# Patient Record
Sex: FEMALE | Race: WHITE | NOT HISPANIC OR LATINO | URBAN - METROPOLITAN AREA
[De-identification: names, ages, dates, MRNs, and addresses within clinical notes are randomized per-mention and may not be internally consistent; named-entity substitution may affect disease eponyms.]

---

## 2022-02-25 ENCOUNTER — NEW PATIENT (OUTPATIENT)
Dept: URBAN - METROPOLITAN AREA CLINIC 26 | Facility: CLINIC | Age: 57
End: 2022-02-25

## 2022-02-25 VITALS
SYSTOLIC BLOOD PRESSURE: 108 MMHG | WEIGHT: 140 LBS | HEIGHT: 67 IN | DIASTOLIC BLOOD PRESSURE: 74 MMHG | HEART RATE: 56 BPM | BODY MASS INDEX: 21.97 KG/M2

## 2022-02-25 DIAGNOSIS — H35.81: ICD-10-CM

## 2022-02-25 DIAGNOSIS — H43.393: ICD-10-CM

## 2022-02-25 PROCEDURE — 99204 OFFICE O/P NEW MOD 45 MIN: CPT

## 2022-02-25 PROCEDURE — 92235 FLUORESCEIN ANGRPH MLTIFRAME: CPT

## 2022-02-25 PROCEDURE — 92250 FUNDUS PHOTOGRAPHY W/I&R: CPT

## 2022-02-25 ASSESSMENT — TONOMETRY
OD_IOP_MMHG: 11
OS_IOP_MMHG: 11

## 2022-02-25 ASSESSMENT — VISUAL ACUITY
OD_SC: 20/80
OS_CC: 20/20
OS_SC: 20/40
OD_CC: 20/20

## 2022-02-25 NOTE — PATIENT DISCUSSION
Discussed workup for cotton wool spots including evaluation for diabetes, hypertension and other vasculopathic conditions.

## 2022-05-27 ENCOUNTER — FOLLOW UP (OUTPATIENT)
Dept: URBAN - METROPOLITAN AREA CLINIC 26 | Facility: CLINIC | Age: 57
End: 2022-05-27

## 2022-05-27 DIAGNOSIS — H25.13: ICD-10-CM

## 2022-05-27 DIAGNOSIS — H02.836: ICD-10-CM

## 2022-05-27 DIAGNOSIS — H35.81: ICD-10-CM

## 2022-05-27 DIAGNOSIS — H02.833: ICD-10-CM

## 2022-05-27 DIAGNOSIS — H43.393: ICD-10-CM

## 2022-05-27 PROCEDURE — 92250 FUNDUS PHOTOGRAPHY W/I&R: CPT

## 2022-05-27 PROCEDURE — 92014 COMPRE OPH EXAM EST PT 1/>: CPT

## 2022-05-27 ASSESSMENT — TONOMETRY
OD_IOP_MMHG: 13
OS_IOP_MMHG: 13

## 2022-05-27 ASSESSMENT — VISUAL ACUITY
OD_CC: 20/25-1
OS_CC: 20/25-1

## 2022-05-27 NOTE — PATIENT DISCUSSION
NEW 5/27/22 NO HISTORY OF HTN, HAS SOME GI SYMPTOMS TO SEE GI, OTHER DIFFERENTIAL DIAGNOSIS DISCUSSED INFLAMMATION, MASQUERADING SYNDROME,.

## 2024-07-29 ENCOUNTER — COMPREHENSIVE EXAM (OUTPATIENT)
Dept: URBAN - METROPOLITAN AREA CLINIC 26 | Facility: CLINIC | Age: 59
End: 2024-07-29

## 2024-07-29 VITALS — BODY MASS INDEX: 21.35 KG/M2 | WEIGHT: 136 LBS | HEIGHT: 67 IN

## 2024-07-29 DIAGNOSIS — H35.371: ICD-10-CM

## 2024-07-29 DIAGNOSIS — H43.393: ICD-10-CM

## 2024-07-29 DIAGNOSIS — H25.13: ICD-10-CM

## 2024-07-29 DIAGNOSIS — H35.81: ICD-10-CM

## 2024-07-29 DIAGNOSIS — H04.123: ICD-10-CM

## 2024-07-29 PROCEDURE — 92134 CPTRZ OPH DX IMG PST SGM RTA: CPT

## 2024-07-29 PROCEDURE — 92250 FUNDUS PHOTOGRAPHY W/I&R: CPT | Mod: 59

## 2024-07-29 PROCEDURE — 92014 COMPRE OPH EXAM EST PT 1/>: CPT

## 2024-07-29 ASSESSMENT — TONOMETRY
OS_IOP_MMHG: 16
OD_IOP_MMHG: 14

## 2024-07-29 ASSESSMENT — VISUAL ACUITY
OD_CC: 20/20
OS_CC: 20/20